# Patient Record
(demographics unavailable — no encounter records)

---

## 2024-10-28 NOTE — HISTORY OF PRESENT ILLNESS
[FreeTextEntry1] : 24yo G0 (LMP: 10/8/24) presents to establish care.   Patient states that after hiking in [facility] in August, patient has had R lower back pain that transitioned to R pelvic pain. Patient describes intermittent sharp R pelvic pain with improvement with Tylenol. Denies fevers, chills, abnormal vaginal discharge, abnormal vaginal bleeding, changes in urinary/bowel habits, CP, SOB.   Last saw her OBGYN Dr. Adan 1 year ago.   Last sexually active 2 months ago. Not on any birth control.   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   Denies alcohol, ever smoking, drug use.   HCM:  Pap smear/HPV: 2022 abnormal pap pt unsure of results  Patient has completed Gardasil vaccine series.

## 2024-10-28 NOTE — PHYSICAL EXAM
[Appropriately responsive] : appropriately responsive [Alert] : alert [No Acute Distress] : no acute distress [Oriented x3] : oriented x3 [Examination Of The Breasts] : a normal appearance [No Masses] : no breast masses were palpable [Labia Majora] : normal [Labia Minora] : normal [Normal] : normal [Uterine Adnexae] : normal [Chaperone Declined] : Patient declined chaperone [[id number]] : A chaperone was present during the pelvic exam. [FreeTextEntry8] : no tenderness, no CMT ,no adnexal fullness

## 2024-10-28 NOTE — PLAN
[FreeTextEntry1] : 24yo G0 (LMP: 10/8/24) presents with persistent pelvic pain x 2 months   #HCM -  h/o abnormal pap smear s/p colposcopy 2022, pt to send records.  - F/u Pap/HPV  - F/u STI testing  #Pelvic pain - [facility] referral given   Seen w/ Dr. Mitul Lomeli, PGY2   Patient screened for depression- no signs of clinical depression. PHQ-9 scores reviewed over the course of the visit 5-10 minutes of face to face time. Follow up with changes in mood including other symptoms of anxiety